# Patient Record
Sex: FEMALE | Race: WHITE | Employment: OTHER | URBAN - METROPOLITAN AREA
[De-identification: names, ages, dates, MRNs, and addresses within clinical notes are randomized per-mention and may not be internally consistent; named-entity substitution may affect disease eponyms.]

---

## 2019-10-29 ENCOUNTER — HOSPITAL ENCOUNTER (OUTPATIENT)
Age: 74
Discharge: HOME | End: 2019-10-29
Attending: EMERGENCY MEDICINE
Payer: MEDICARE

## 2019-10-29 ENCOUNTER — APPOINTMENT (OUTPATIENT)
Dept: GENERAL RADIOLOGY | Age: 74
End: 2019-10-29
Attending: EMERGENCY MEDICINE
Payer: MEDICARE

## 2019-10-29 VITALS
HEART RATE: 98 BPM | DIASTOLIC BLOOD PRESSURE: 84 MMHG | TEMPERATURE: 99 F | BODY MASS INDEX: 29.99 KG/M2 | OXYGEN SATURATION: 99 % | RESPIRATION RATE: 20 BRPM | WEIGHT: 180 LBS | SYSTOLIC BLOOD PRESSURE: 143 MMHG | HEIGHT: 65 IN

## 2019-10-29 DIAGNOSIS — R05.8 PRODUCTIVE COUGH: Primary | ICD-10-CM

## 2019-10-29 PROCEDURE — 99203 OFFICE O/P NEW LOW 30 MIN: CPT

## 2019-10-29 PROCEDURE — 99204 OFFICE O/P NEW MOD 45 MIN: CPT

## 2019-10-29 PROCEDURE — 71046 X-RAY EXAM CHEST 2 VIEWS: CPT | Performed by: EMERGENCY MEDICINE

## 2019-10-29 RX ORDER — AZITHROMYCIN 250 MG/1
TABLET, FILM COATED ORAL
Qty: 1 PACKAGE | Refills: 0 | Status: SHIPPED | OUTPATIENT
Start: 2019-10-29

## 2019-10-29 RX ORDER — LETROZOLE 2.5 MG/1
2.5 TABLET, FILM COATED ORAL DAILY
COMMUNITY

## 2019-10-29 RX ORDER — LORATADINE 10 MG/1
10 TABLET ORAL DAILY
COMMUNITY

## 2019-10-29 RX ORDER — DULOXETIN HYDROCHLORIDE 30 MG/1
30 CAPSULE, DELAYED RELEASE ORAL DAILY
COMMUNITY

## 2019-10-29 RX ORDER — SIMVASTATIN 5 MG
5 TABLET ORAL NIGHTLY
COMMUNITY

## 2019-10-29 RX ORDER — LEVOTHYROXINE SODIUM 0.05 MG/1
50 TABLET ORAL
COMMUNITY

## 2019-10-29 RX ORDER — BENZONATATE 100 MG/1
100 CAPSULE ORAL 3 TIMES DAILY PRN
Qty: 30 CAPSULE | Refills: 0 | Status: SHIPPED | OUTPATIENT
Start: 2019-10-29 | End: 2019-11-28

## 2019-10-29 NOTE — ED PROVIDER NOTES
Patient Seen in: [address]      History   Patient presents with:  Cough    Stated Complaint: cough    HPI    The patient is a 79-year-old female with past history of breast cancer, status post bilateral mastectomies who tenderness  Eyes: Nonicteric sclera, no conjunctival injection  ENT: TMs are clear and flat bilaterally.   There is no posterior pharyngeal erythema  Chest: Clear to auscultation, no tenderness  Cardiovascular: Regular rate and rhythm without murmur  Abdome Refills: 0

## 2019-10-29 NOTE — ED INITIAL ASSESSMENT (HPI)
Patient is here with a dry cough that started as a cold and then it went to a wet and productive but now has a constant dry cough. She states it started three weeks ago.

## 2020-10-09 ENCOUNTER — HOSPITAL ENCOUNTER (OUTPATIENT)
Age: 75
Discharge: HOME | End: 2020-10-09
Payer: MEDICARE

## 2020-10-09 VITALS
HEIGHT: 65 IN | WEIGHT: 189 LBS | DIASTOLIC BLOOD PRESSURE: 83 MMHG | TEMPERATURE: 97 F | SYSTOLIC BLOOD PRESSURE: 157 MMHG | OXYGEN SATURATION: 98 % | HEART RATE: 97 BPM | BODY MASS INDEX: 31.49 KG/M2 | RESPIRATION RATE: 16 BRPM

## 2020-10-09 DIAGNOSIS — R19.7 DIARRHEA, UNSPECIFIED TYPE: ICD-10-CM

## 2020-10-09 DIAGNOSIS — Z20.822 ENCOUNTER FOR LABORATORY TESTING FOR COVID-19 VIRUS: Primary | ICD-10-CM

## 2020-10-09 PROCEDURE — 99202 OFFICE O/P NEW SF 15 MIN: CPT | Performed by: NURSE PRACTITIONER

## 2020-10-09 NOTE — ED PROVIDER NOTES
Maite-She tried a RFA by Dr. Ferrer and said it didn't work, but she did not follow up. He suggested an IVETH, what do you advise?   Patient presents with:  Nausea/Vomiting/Diarrhea      HPI:     Alex Qureshi is a 69year old female who presents for evaluation and management of a chief complaint of body aches, fatigue, and diarrhea that started yesterday evening.   She states s Social connections        Talks on phone: Not on file        Gets together: Not on file        Attends Church service: Not on file        Active member of club or organization: Not on file        Attends meetings of clubs or organizations: Not on file will follow-up closely with her primary care doctor. She is aware if she develops any abdominal pain, vomiting, fevers, chest pain, difficulty breathing, or worsening symptoms, to go to the nearest emergency department for further evaluation.     Diagnosis

## 2022-09-16 ENCOUNTER — HOSPITAL ENCOUNTER (OUTPATIENT)
Age: 77
Discharge: HOME | End: 2022-09-16
Payer: MEDICARE

## 2022-09-16 VITALS
BODY MASS INDEX: 29.16 KG/M2 | HEIGHT: 65 IN | HEART RATE: 91 BPM | WEIGHT: 175 LBS | RESPIRATION RATE: 20 BRPM | OXYGEN SATURATION: 99 % | DIASTOLIC BLOOD PRESSURE: 81 MMHG | TEMPERATURE: 98 F | SYSTOLIC BLOOD PRESSURE: 158 MMHG

## 2022-09-16 DIAGNOSIS — J02.9 SORE THROAT: ICD-10-CM

## 2022-09-16 DIAGNOSIS — Z20.822 ENCOUNTER FOR SCREENING LABORATORY TESTING FOR COVID-19 VIRUS: Primary | ICD-10-CM

## 2022-09-16 DIAGNOSIS — U07.1 COVID-19: ICD-10-CM

## 2022-09-16 LAB — SARS-COV-2 RNA RESP QL NAA+PROBE: DETECTED

## 2022-09-16 RX ORDER — BENZONATATE 100 MG/1
100 CAPSULE ORAL 3 TIMES DAILY PRN
Qty: 21 CAPSULE | Refills: 0 | Status: SHIPPED | OUTPATIENT
Start: 2022-09-16 | End: 2022-09-23

## 2022-09-16 NOTE — ED INITIAL ASSESSMENT (HPI)
Patient complains of cough, sore throat, headache, stomach upset, body generalized itching, fever, hives after itching x5days

## 2022-10-06 ENCOUNTER — LABORATORY ENCOUNTER (OUTPATIENT)
Dept: LAB | Age: 77
End: 2022-10-06
Attending: INTERNAL MEDICINE
Payer: MEDICARE

## 2022-10-06 DIAGNOSIS — E03.9 ACQUIRED HYPOTHYROIDISM: ICD-10-CM

## 2022-10-06 DIAGNOSIS — E78.2 MIXED HYPERLIPIDEMIA: ICD-10-CM

## 2022-10-06 DIAGNOSIS — R53.83 FATIGUE, UNSPECIFIED TYPE: Primary | ICD-10-CM

## 2022-10-06 LAB
ALBUMIN SERPL-MCNC: 3.7 G/DL (ref 3.4–5)
ALBUMIN/GLOB SERPL: 0.9 {RATIO} (ref 1–2)
ALP LIVER SERPL-CCNC: 120 U/L
ALT SERPL-CCNC: 88 U/L
ANION GAP SERPL CALC-SCNC: 5 MMOL/L (ref 0–18)
AST SERPL-CCNC: 56 U/L (ref 15–37)
BILIRUB SERPL-MCNC: 0.7 MG/DL (ref 0.1–2)
BILIRUB UR QL CFM: NEGATIVE
BUN BLD-MCNC: 8 MG/DL (ref 7–18)
BUN/CREAT SERPL: 10.5 (ref 10–20)
CALCIUM BLD-MCNC: 9.4 MG/DL (ref 8.5–10.1)
CHLORIDE SERPL-SCNC: 102 MMOL/L (ref 98–112)
CHOLEST SERPL-MCNC: 205 MG/DL (ref ?–200)
CLARITY UR: CLEAR
CO2 SERPL-SCNC: 29 MMOL/L (ref 21–32)
COLOR UR: YELLOW
CREAT BLD-MCNC: 0.76 MG/DL
FASTING PATIENT LIPID ANSWER: YES
FASTING STATUS PATIENT QL REPORTED: YES
GFR SERPLBLD BASED ON 1.73 SQ M-ARVRAT: 82 ML/MIN/1.73M2 (ref 60–?)
GLOBULIN PLAS-MCNC: 3.9 G/DL (ref 2.8–4.4)
GLUCOSE BLD-MCNC: 276 MG/DL (ref 70–99)
GLUCOSE UR-MCNC: 250 MG/DL
HDLC SERPL-MCNC: 47 MG/DL (ref 40–59)
HGB UR QL STRIP.AUTO: NEGATIVE
LDLC SERPL CALC-MCNC: 133 MG/DL (ref ?–100)
NITRITE UR QL STRIP.AUTO: NEGATIVE
NONHDLC SERPL-MCNC: 158 MG/DL (ref ?–130)
OSMOLALITY SERPL CALC.SUM OF ELEC: 290 MOSM/KG (ref 275–295)
PH UR: 5.5 [PH] (ref 5–8)
POTASSIUM SERPL-SCNC: 3.8 MMOL/L (ref 3.5–5.1)
PROT SERPL-MCNC: 7.6 G/DL (ref 6.4–8.2)
SODIUM SERPL-SCNC: 136 MMOL/L (ref 136–145)
SP GR UR STRIP: >=1.03 (ref 1–1.03)
T4 FREE SERPL-MCNC: 0.8 NG/DL (ref 0.8–1.7)
TRIGL SERPL-MCNC: 141 MG/DL (ref 30–149)
TSI SER-ACNC: 6.91 MIU/ML (ref 0.36–3.74)
UROBILINOGEN UR STRIP-ACNC: 1
VLDLC SERPL CALC-MCNC: 26 MG/DL (ref 0–30)

## 2022-10-06 PROCEDURE — 84443 ASSAY THYROID STIM HORMONE: CPT

## 2022-10-06 PROCEDURE — 84439 ASSAY OF FREE THYROXINE: CPT

## 2022-10-06 PROCEDURE — 36415 COLL VENOUS BLD VENIPUNCTURE: CPT

## 2022-10-06 PROCEDURE — 80053 COMPREHEN METABOLIC PANEL: CPT

## 2022-10-06 PROCEDURE — 81015 MICROSCOPIC EXAM OF URINE: CPT

## 2022-10-06 PROCEDURE — 81001 URINALYSIS AUTO W/SCOPE: CPT

## 2022-10-06 PROCEDURE — 80061 LIPID PANEL: CPT

## 2023-09-27 ENCOUNTER — LAB ENCOUNTER (OUTPATIENT)
Dept: LAB | Facility: HOSPITAL | Age: 78
End: 2023-09-27
Attending: INTERNAL MEDICINE
Payer: MEDICARE

## 2023-09-27 DIAGNOSIS — E03.9 MYXEDEMA HEART DISEASE: ICD-10-CM

## 2023-09-27 DIAGNOSIS — E55.9 HYPOVITAMINOSIS D: ICD-10-CM

## 2023-09-27 DIAGNOSIS — R03.0 ELEVATED BLOOD PRESSURE READING WITHOUT DIAGNOSIS OF HYPERTENSION: ICD-10-CM

## 2023-09-27 DIAGNOSIS — R74.8 ACID PHOSPHATASE ELEVATED: ICD-10-CM

## 2023-09-27 DIAGNOSIS — R53.83 FATIGUE: ICD-10-CM

## 2023-09-27 DIAGNOSIS — E11.9 DIABETES MELLITUS (HCC): Primary | ICD-10-CM

## 2023-09-27 DIAGNOSIS — I51.9 MYXEDEMA HEART DISEASE: ICD-10-CM

## 2023-09-27 LAB
ALBUMIN SERPL-MCNC: 3.8 G/DL (ref 3.4–5)
ALBUMIN/GLOB SERPL: 1 {RATIO} (ref 1–2)
ALP LIVER SERPL-CCNC: 82 U/L
ALT SERPL-CCNC: 46 U/L
ANION GAP SERPL CALC-SCNC: 6 MMOL/L (ref 0–18)
AST SERPL-CCNC: 26 U/L (ref 15–37)
BASOPHILS # BLD AUTO: 0.06 X10(3) UL (ref 0–0.2)
BASOPHILS NFR BLD AUTO: 1.1 %
BILIRUB SERPL-MCNC: 0.6 MG/DL (ref 0.1–2)
BILIRUB UR QL: NEGATIVE
BUN BLD-MCNC: 24 MG/DL (ref 7–18)
BUN/CREAT SERPL: 30.4 (ref 10–20)
CALCIUM BLD-MCNC: 8.9 MG/DL (ref 8.5–10.1)
CHLORIDE SERPL-SCNC: 107 MMOL/L (ref 98–112)
CHOLEST SERPL-MCNC: 208 MG/DL (ref ?–200)
CO2 SERPL-SCNC: 26 MMOL/L (ref 21–32)
COLOR UR: YELLOW
CREAT BLD-MCNC: 0.79 MG/DL
DEPRECATED RDW RBC AUTO: 41.5 FL (ref 35.1–46.3)
EGFRCR SERPLBLD CKD-EPI 2021: 78 ML/MIN/1.73M2 (ref 60–?)
EOSINOPHIL # BLD AUTO: 0.27 X10(3) UL (ref 0–0.7)
EOSINOPHIL NFR BLD AUTO: 4.9 %
ERYTHROCYTE [DISTWIDTH] IN BLOOD BY AUTOMATED COUNT: 12.7 % (ref 11–15)
EST. AVERAGE GLUCOSE BLD GHB EST-MCNC: 117 MG/DL (ref 68–126)
FASTING PATIENT LIPID ANSWER: YES
FASTING STATUS PATIENT QL REPORTED: YES
GLOBULIN PLAS-MCNC: 3.9 G/DL (ref 2.8–4.4)
GLUCOSE BLD-MCNC: 108 MG/DL (ref 70–99)
GLUCOSE UR-MCNC: NORMAL MG/DL
HBA1C MFR BLD: 5.7 % (ref ?–5.7)
HCT VFR BLD AUTO: 43.3 %
HDLC SERPL-MCNC: 52 MG/DL (ref 40–59)
HGB BLD-MCNC: 14.6 G/DL
HGB UR QL STRIP.AUTO: NEGATIVE
IMM GRANULOCYTES # BLD AUTO: 0.02 X10(3) UL (ref 0–1)
IMM GRANULOCYTES NFR BLD: 0.4 %
KETONES UR-MCNC: NEGATIVE MG/DL
LDLC SERPL CALC-MCNC: 135 MG/DL (ref ?–100)
LEUKOCYTE ESTERASE UR QL STRIP.AUTO: NEGATIVE
LYMPHOCYTES # BLD AUTO: 1.73 X10(3) UL (ref 1–4)
LYMPHOCYTES NFR BLD AUTO: 31.3 %
MCH RBC QN AUTO: 30 PG (ref 26–34)
MCHC RBC AUTO-ENTMCNC: 33.7 G/DL (ref 31–37)
MCV RBC AUTO: 88.9 FL
MONOCYTES # BLD AUTO: 0.41 X10(3) UL (ref 0.1–1)
MONOCYTES NFR BLD AUTO: 7.4 %
NEUTROPHILS # BLD AUTO: 3.03 X10 (3) UL (ref 1.5–7.7)
NEUTROPHILS # BLD AUTO: 3.03 X10(3) UL (ref 1.5–7.7)
NEUTROPHILS NFR BLD AUTO: 54.9 %
NITRITE UR QL STRIP.AUTO: NEGATIVE
NONHDLC SERPL-MCNC: 156 MG/DL (ref ?–130)
OSMOLALITY SERPL CALC.SUM OF ELEC: 293 MOSM/KG (ref 275–295)
PH UR: 5.5 [PH] (ref 5–8)
PLATELET # BLD AUTO: 255 10(3)UL (ref 150–450)
POTASSIUM SERPL-SCNC: 4.1 MMOL/L (ref 3.5–5.1)
PROT SERPL-MCNC: 7.7 G/DL (ref 6.4–8.2)
PROT UR-MCNC: 20 MG/DL
RBC # BLD AUTO: 4.87 X10(6)UL
SODIUM SERPL-SCNC: 139 MMOL/L (ref 136–145)
SP GR UR STRIP: 1.03 (ref 1–1.03)
TRIGL SERPL-MCNC: 117 MG/DL (ref 30–149)
TSI SER-ACNC: 5.74 MIU/ML (ref 0.36–3.74)
UROBILINOGEN UR STRIP-ACNC: 2
VIT D+METAB SERPL-MCNC: 41.1 NG/ML (ref 30–100)
VLDLC SERPL CALC-MCNC: 21 MG/DL (ref 0–30)
WBC # BLD AUTO: 5.5 X10(3) UL (ref 4–11)

## 2023-09-27 PROCEDURE — 81001 URINALYSIS AUTO W/SCOPE: CPT

## 2023-09-27 PROCEDURE — 85025 COMPLETE CBC W/AUTO DIFF WBC: CPT

## 2023-09-27 PROCEDURE — 80053 COMPREHEN METABOLIC PANEL: CPT

## 2023-09-27 PROCEDURE — 84443 ASSAY THYROID STIM HORMONE: CPT

## 2023-09-27 PROCEDURE — 36415 COLL VENOUS BLD VENIPUNCTURE: CPT

## 2023-09-27 PROCEDURE — 82306 VITAMIN D 25 HYDROXY: CPT

## 2023-09-27 PROCEDURE — 80061 LIPID PANEL: CPT

## 2023-09-27 PROCEDURE — 83036 HEMOGLOBIN GLYCOSYLATED A1C: CPT

## 2024-01-24 ENCOUNTER — ORDER TRANSCRIPTION (OUTPATIENT)
Dept: ADMINISTRATIVE | Facility: HOSPITAL | Age: 79
End: 2024-01-24

## 2024-01-24 DIAGNOSIS — Z01.818 PRE-OP EXAM: ICD-10-CM

## 2024-01-24 DIAGNOSIS — C44.91 BASAL CELL CARCINOMA: Primary | ICD-10-CM

## 2024-02-06 ENCOUNTER — LAB ENCOUNTER (OUTPATIENT)
Dept: LAB | Age: 79
End: 2024-02-06
Attending: DERMATOLOGY
Payer: MEDICARE

## 2024-02-06 DIAGNOSIS — C44.91 BASAL CELL CARCINOMA: ICD-10-CM

## 2024-02-06 DIAGNOSIS — Z01.818 PRE-OP EXAM: ICD-10-CM

## 2024-02-06 PROCEDURE — 87635 SARS-COV-2 COVID-19 AMP PRB: CPT

## 2024-02-07 LAB — SARS-COV-2 RNA RESP QL NAA+PROBE: NOT DETECTED

## 2024-09-25 ENCOUNTER — LAB ENCOUNTER (OUTPATIENT)
Dept: LAB | Facility: HOSPITAL | Age: 79
End: 2024-09-25
Attending: INTERNAL MEDICINE
Payer: MEDICARE

## 2024-09-25 DIAGNOSIS — R53.82 CHRONIC FATIGUE: ICD-10-CM

## 2024-09-25 DIAGNOSIS — E11.9 DIABETES MELLITUS (HCC): Primary | ICD-10-CM

## 2024-09-25 DIAGNOSIS — E03.9 ACQUIRED HYPOTHYROIDISM: ICD-10-CM

## 2024-09-25 DIAGNOSIS — C50.512 MALIGNANT NEOPLASM OF LOWER-OUTER QUADRANT OF LEFT FEMALE BREAST (HCC): ICD-10-CM

## 2024-09-25 LAB
ALBUMIN SERPL-MCNC: 4.4 G/DL (ref 3.2–4.8)
ALBUMIN/GLOB SERPL: 1.4 {RATIO} (ref 1–2)
ALP LIVER SERPL-CCNC: 92 U/L
ALT SERPL-CCNC: 38 U/L
ANION GAP SERPL CALC-SCNC: 5 MMOL/L (ref 0–18)
AST SERPL-CCNC: 29 U/L (ref ?–34)
BASOPHILS # BLD AUTO: 0.05 X10(3) UL (ref 0–0.2)
BASOPHILS NFR BLD AUTO: 0.9 %
BILIRUB SERPL-MCNC: 1 MG/DL (ref 0.2–1.1)
BILIRUB UR QL: NEGATIVE
BUN BLD-MCNC: 10 MG/DL (ref 9–23)
BUN/CREAT SERPL: 13.5 (ref 10–20)
CALCIUM BLD-MCNC: 9.6 MG/DL (ref 8.7–10.4)
CHLORIDE SERPL-SCNC: 105 MMOL/L (ref 98–112)
CHOLEST SERPL-MCNC: 163 MG/DL (ref ?–200)
CO2 SERPL-SCNC: 29 MMOL/L (ref 21–32)
CREAT BLD-MCNC: 0.74 MG/DL
DEPRECATED RDW RBC AUTO: 42.3 FL (ref 35.1–46.3)
EGFRCR SERPLBLD CKD-EPI 2021: 84 ML/MIN/1.73M2 (ref 60–?)
EOSINOPHIL # BLD AUTO: 0.2 X10(3) UL (ref 0–0.7)
EOSINOPHIL NFR BLD AUTO: 3.5 %
ERYTHROCYTE [DISTWIDTH] IN BLOOD BY AUTOMATED COUNT: 12.9 % (ref 11–15)
EST. AVERAGE GLUCOSE BLD GHB EST-MCNC: 108 MG/DL (ref 68–126)
FASTING PATIENT LIPID ANSWER: YES
FASTING STATUS PATIENT QL REPORTED: YES
GLOBULIN PLAS-MCNC: 3.2 G/DL (ref 2–3.5)
GLUCOSE BLD-MCNC: 100 MG/DL (ref 70–99)
GLUCOSE UR-MCNC: NORMAL MG/DL
HBA1C MFR BLD: 5.4 % (ref ?–5.7)
HCT VFR BLD AUTO: 42.8 %
HDLC SERPL-MCNC: 51 MG/DL (ref 40–59)
HGB BLD-MCNC: 14.1 G/DL
HGB UR QL STRIP.AUTO: NEGATIVE
IMM GRANULOCYTES # BLD AUTO: 0.01 X10(3) UL (ref 0–1)
IMM GRANULOCYTES NFR BLD: 0.2 %
KETONES UR-MCNC: NEGATIVE MG/DL
LDLC SERPL CALC-MCNC: 91 MG/DL (ref ?–100)
LEUKOCYTE ESTERASE UR QL STRIP.AUTO: 75
LYMPHOCYTES # BLD AUTO: 1.29 X10(3) UL (ref 1–4)
LYMPHOCYTES NFR BLD AUTO: 22.7 %
MCH RBC QN AUTO: 29.6 PG (ref 26–34)
MCHC RBC AUTO-ENTMCNC: 32.9 G/DL (ref 31–37)
MCV RBC AUTO: 89.7 FL
MONOCYTES # BLD AUTO: 0.36 X10(3) UL (ref 0.1–1)
MONOCYTES NFR BLD AUTO: 6.3 %
NEUTROPHILS # BLD AUTO: 3.77 X10 (3) UL (ref 1.5–7.7)
NEUTROPHILS # BLD AUTO: 3.77 X10(3) UL (ref 1.5–7.7)
NEUTROPHILS NFR BLD AUTO: 66.4 %
NITRITE UR QL STRIP.AUTO: NEGATIVE
NONHDLC SERPL-MCNC: 112 MG/DL (ref ?–130)
OSMOLALITY SERPL CALC.SUM OF ELEC: 287 MOSM/KG (ref 275–295)
PH UR: 7.5 [PH] (ref 5–8)
PLATELET # BLD AUTO: 252 10(3)UL (ref 150–450)
POTASSIUM SERPL-SCNC: 4.2 MMOL/L (ref 3.5–5.1)
PROT SERPL-MCNC: 7.6 G/DL (ref 5.7–8.2)
PROT UR-MCNC: NEGATIVE MG/DL
RBC # BLD AUTO: 4.77 X10(6)UL
SODIUM SERPL-SCNC: 139 MMOL/L (ref 136–145)
SP GR UR STRIP: 1.02 (ref 1–1.03)
TRIGL SERPL-MCNC: 119 MG/DL (ref 30–149)
TSI SER-ACNC: 3.17 MIU/ML (ref 0.55–4.78)
UROBILINOGEN UR STRIP-ACNC: NORMAL
VLDLC SERPL CALC-MCNC: 19 MG/DL (ref 0–30)
WBC # BLD AUTO: 5.7 X10(3) UL (ref 4–11)

## 2024-09-25 PROCEDURE — 80061 LIPID PANEL: CPT

## 2024-09-25 PROCEDURE — 84443 ASSAY THYROID STIM HORMONE: CPT

## 2024-09-25 PROCEDURE — 81001 URINALYSIS AUTO W/SCOPE: CPT

## 2024-09-25 PROCEDURE — 85025 COMPLETE CBC W/AUTO DIFF WBC: CPT

## 2024-09-25 PROCEDURE — 36415 COLL VENOUS BLD VENIPUNCTURE: CPT

## 2024-09-25 PROCEDURE — 80053 COMPREHEN METABOLIC PANEL: CPT

## 2024-09-25 PROCEDURE — 83036 HEMOGLOBIN GLYCOSYLATED A1C: CPT

## 2025-07-08 ENCOUNTER — TELEPHONE (OUTPATIENT)
Facility: CLINIC | Age: 80
End: 2025-07-08

## 2025-07-08 ENCOUNTER — OFFICE VISIT (OUTPATIENT)
Facility: CLINIC | Age: 80
End: 2025-07-08
Payer: COMMERCIAL

## 2025-07-08 VITALS
WEIGHT: 188 LBS | DIASTOLIC BLOOD PRESSURE: 78 MMHG | HEART RATE: 82 BPM | SYSTOLIC BLOOD PRESSURE: 147 MMHG | HEIGHT: 65 IN | BODY MASS INDEX: 31.32 KG/M2

## 2025-07-08 DIAGNOSIS — Z12.11 SCREENING FOR COLON CANCER: Primary | ICD-10-CM

## 2025-07-08 DIAGNOSIS — Z12.11 SCREEN FOR COLON CANCER: Primary | ICD-10-CM

## 2025-07-08 RX ORDER — SODIUM, POTASSIUM,MAG SULFATES 17.5-3.13G
SOLUTION, RECONSTITUTED, ORAL ORAL
Qty: 1 EACH | Refills: 0 | Status: SHIPPED | OUTPATIENT
Start: 2025-07-08

## 2025-07-08 RX ORDER — MONTELUKAST SODIUM 10 MG/1
10 TABLET ORAL NIGHTLY
COMMUNITY
Start: 2025-01-15

## 2025-07-08 NOTE — PATIENT INSTRUCTIONS
1. Schedule colonoscopy with General Pool Endoscopist  Diagnosis: colon cancer screening  Sedation: MAC  Prep: Suprep      2. MEDICATION CHANGES PRIOR TO PROCEDURE    Please read the Bowel Preparation Instruction Sheet carefully, as it contains important information regarding your medications before the procedure. Many medications will need to be temporarily stopped to ensure your safety and the success of the procedure.   Commonly stopped medications include:    Diabetic medications (such as: metformin, glimepiride, sitagliptin (januvia), empagliflozin (jardiance), GLP-1 (semaglutide/ozempic/wegovy), insulin)    Weight loss medications (such as: phentermine, semaglutide/wegovy/ozempic, or tirzepatide/zepbound/mounjaro, contrave)    Anticoagulants or antiplatelet medications \"blood thinner\" (such as apixaban (Eliquis), warfarin (Coumadin), clopidogrel (Plavix), rivaroxaban (Xarelto))    Certain hypertension medications \"Ace inhibitors or ARBs\" (such as lisinopril, enalapril, losartan, valsartan, olmesartan)    Medications to treat erectile dysfunction, benign prostatic hyperplasia or pulmonary hypertension (such as viagra/sildenafil, Tadalafil (Cialis)    Additionally, vitamins and supplements should be stopped for 2 weeks prior to the procedure.    Failure to correctly stop these medications as instructed can result in cancellation of the procedure for your safety. It is crucial to follow these instructions closely. If you have any questions or concerns regarding your medications, please contact your healthcare provider before the procedure date.      *please read bowel preparation handout for complete list of medication adjustments*  14 Days Before Procedure   STOP all vitamins and supplements, including: Iron, multivitamins, vitamin E, herbal/mineral supplements, and over-the-counter diet medications    7 Days Before Procedure  STOP prasugrel (Effient) - antiplatelet  STOP Ozempic, semaglutide, trulicity,  mounjaro, wegovy  STOP any weight less medication    5 Days Before Procedure   STOP any of the following:  - Warfarin (Coumadin)  - Ticagrelor (Brilinta)  - Clopidogrel (Plavix)  - Acetylsalicylic acid / Dipyridamole (Aggrenox)    4 Days Before Procedure   STOP any of the following:  - Jardiance  - Dapagliflozin (Farxiga)  - Canagliflozin (Invokana)    3 Days Before Procedure   STOP any erectile dysfunction medications (e.g., Viagra, Cialis, Levitra, Slendra)    2 Days (48 Hours) Before Procedure   STOP any of the following:  - Rivaroxaban (Xarelto)  - Apixaban (Eliquis)  - Edoxaban (Savaysa)  - Dabigatran (Pradaxa)    1 Day Before & Day of Procedure   STOP Metformin   STOP Glimepiride, glipizide, pioglitazone   STOP Januvia, Tradjenta, Janumet    Day of Procedure   STOP all ADHD medications   STOP all diuretics    24 Hours Before Procedure   DO NOT CONSUME any alcohol or use recreational drugs       3.  bowel prep from pharmacy   You can pick the bowel prep up now and store in a cool, dry place in your home until your scheduled bowel prep start date.    4. Read all bowel prep instructions carefully. Bowel prep instructions can also be found online at:  www.eehealth.org/giprep     5. AVOID seeds, nuts, popcorn, raw fruits and vegetables for 5 days before procedure    6. If you start any NEW medication after your visit today, please notify us. Certain medications will need to be stopped before the procedure, or the procedure cannot be performed safely.

## 2025-07-08 NOTE — TELEPHONE ENCOUNTER
Patient was seen in office today with CHRISTIE Paz and was given orders to schedule. Please call patient to schedule his/her procedure with the orders given below. Patient was given the phone number and prep instructions at the time of the office visit. The prep instructions was also explained to the patient at the time of the visit. The patient verbalized understanding the prep and that a GI  will call him/her for the procedure.  If patient calls before the 1 week turnaround please schedule with the orders given below, thank you!    Orders from CHRISTIE Paz    1. Schedule colonoscopy with General Pool Endoscopist  Diagnosis: colon cancer screening  Sedation: MAC  Prep: Suprep        2. MEDICATION CHANGES PRIOR TO PROCEDURE    Please read the Bowel Preparation Instruction Sheet carefully, as it contains important information regarding your medications before the procedure. Many medications will need to be temporarily stopped to ensure your safety and the success of the procedure.   Commonly stopped medications include:    Diabetic medications (such as: metformin, glimepiride, sitagliptin (januvia), empagliflozin (jardiance), GLP-1 (semaglutide/ozempic/wegovy), insulin)    Weight loss medications (such as: phentermine, semaglutide/wegovy/ozempic, or tirzepatide/zepbound/mounjaro, contrave)    Anticoagulants or antiplatelet medications \"blood thinner\" (such as apixaban (Eliquis), warfarin (Coumadin), clopidogrel (Plavix), rivaroxaban (Xarelto))    Certain hypertension medications \"Ace inhibitors or ARBs\" (such as lisinopril, enalapril, losartan, valsartan, olmesartan)     Medications to treat erectile dysfunction, benign prostatic hyperplasia or pulmonary hypertension (such as viagra/sildenafil, Tadalafil (Cialis)    Additionally, vitamins and supplements should be stopped for 2 weeks prior to the procedure.    Failure to correctly stop these medications as instructed can result in  cancellation of the procedure for your safety. It is crucial to follow these instructions closely. If you have any questions or concerns regarding your medications, please contact your healthcare provider before the procedure date.       *please read bowel preparation handout for complete list of medication adjustments*  14 Days Before Procedure   STOP all vitamins and supplements, including: Iron, multivitamins, vitamin E, herbal/mineral supplements, and over-the-counter diet medications    7 Days Before Procedure  STOP prasugrel (Effient) - antiplatelet  STOP Ozempic, semaglutide, trulicity, mounjaro, wegovy  STOP any weight less medication    5 Days Before Procedure   STOP any of the following:  - Warfarin (Coumadin)  - Ticagrelor (Brilinta)  - Clopidogrel (Plavix)  - Acetylsalicylic acid / Dipyridamole (Aggrenox)    4 Days Before Procedure   STOP any of the following:  - Jardiance  - Dapagliflozin (Farxiga)  - Canagliflozin (Invokana)    3 Days Before Procedure   STOP any erectile dysfunction medications (e.g., Viagra, Cialis, Levitra, Slendra)    2 Days (48 Hours) Before Procedure   STOP any of the following:  - Rivaroxaban (Xarelto)  - Apixaban (Eliquis)  - Edoxaban (Savaysa)  - Dabigatran (Pradaxa)    1 Day Before & Day of Procedure   STOP Metformin   STOP Glimepiride, glipizide, pioglitazone   STOP Januvia, Tradjenta, Janumet    Day of Procedure   STOP all ADHD medications   STOP all diuretics    24 Hours Before Procedure   DO NOT CONSUME any alcohol or use recreational drugs         3.  bowel prep from pharmacy   You can pick the bowel prep up now and store in a cool, dry place in your home until your scheduled bowel prep start date.     4. Read all bowel prep instructions carefully. Bowel prep instructions can also be found online at:  www.eehealth.org/giprep      5. AVOID seeds, nuts, popcorn, raw fruits and vegetables for 5 days before procedure     6. If you start any NEW medication after your  visit today, please notify us. Certain medications will need to be stopped before the procedure, or the procedure cannot be performed safely.

## 2025-07-08 NOTE — H&P
[facility] - Gastroenterology                                                                                                  Clinic History and Physical     Chief Complaint   Patient presents with    Colonoscopy Screening     No history.        Requesting physician or provider: GERARDO LEO    HPI:   Francy Beltran is a 79 year old year-old female with history of breast cancer, hypothyroidism, hyperlipidemia, fibromyalgia. The patient presents for colon cancer screening evaluation.    Last colonoscopy 2014 at [facility]. No polyps, was given 10 year recall.   Hx breast cancer 12 years ago, oncology reccommended repeating even though she is over the age of 77.    Patient denies any GI symptoms of nausea, vomiting, heartburn, dyspepsia, dysphagia, hematemesis, abdominal pain, change in bowel habits, constipation, diarrhea, hematochezia, or melena.  Additionally there is no unintentional weight loss.      PRIOR GI WORK UP:   11/14:  Procedure:           Colonoscopy  Indications:         Screening for colorectal malignant neoplasm  Providers:           Michael J. Goldberg, MD  Referring MD:        George Miller  Medicines:           Fentanyl 75 micrograms IV, Midazolam 4 mg IV  Complications:       No immediate complications.    Procedure:           Pre-Anesthesia Assessment:                      - Prior to the procedure, a History and Physical was                      performed, and patient medications, allergies and                      sensitivities were reviewed. The patient's tolerance of                      previous anesthesia was reviewed.                      - The risks and benefits of the procedure and the                      sedation options and risks were discussed with the                      patient. All questions were answered and informed                      consent was obtained.                      After I obtained informed consent, the scope was passed                      under  direct vision. Throughout the procedure, the                      patient's blood pressure, pulse, and oxygen saturations                      were monitored continuously. Specimens: as below,                      otherwise none. The pediatric variable colonoscope was                      introduced through the anus and advanced to the cecum,                      identified by appendiceal orifice & ileocecal valve.                      The colonoscopy was performed without difficulty. The                      patient tolerated the procedure well. The quality of                      the bowel preparation was good.        NSAIDS: none  Tobacco: none  Alcohol: none  Marijuana: none  Illicit drugs: none    FH GI malignancy:  none    No history of adverse reaction to sedation  No BASSEM  No anticoagulants/antiplatelet  No pacemaker/defibrillator  No pain medications and/or sleep aides    Wt Readings from Last 6 Encounters:   07/08/25 188 lb (85.3 kg)   09/16/22 175 lb (79.4 kg)   10/09/20 189 lb (85.7 kg)   10/29/19 180 lb (81.6 kg)          History, Medications, Allergies, ROS:      Past Medical History[1]   Past Surgical History[2]   Family Hx: Family History[3]   Social History: Short Social Hx on File[4]     Medications (Active prior to today's visit):  Current Medications[5]    Allergies:  Allergies[6]    ROS:   CONSTITUTIONAL: negative for fevers, chills, sweats  EYES Negative for scleral icterus or redness, and diplopia  HEENT: Negative for hoarseness  RESPIRATORY: Negative for cough and severe shortness of breath  CARDIOVASCULAR: Negative for crushing sub-sternal chest pain  GASTROINTESTINAL: See HPI  GENITOURINARY: Negative for dysuria  MUSCULOSKELETAL: Negative for arthralgias and myalgias  SKIN: Negative for jaundice, rash or pruritus  NEUROLOGICAL: Negative for dizziness and headaches  BEHAVIOR/PSYCH: Negative for psychotic behavior      PHYSICAL EXAM:   Blood pressure 147/78, pulse 82, height 5' 5\" (1.651 m),  weight 188 lb (85.3 kg).    GEN: Alert, no acute distress, well-nourished   HEENT: anicteric sclera, neck supple, trachea midline, MMM, no palpable or tender neck or supraclavicular lymph nodes  CV: RRR, the extremities are warm and well perfused   LUNGS: No increased work of breathing, CTAB  ABDOMEN: Soft, symmetrical, non-tender without distention or guarding. No scars or lesions. Aorta is without bruit or visible pulsation. Umbilicus is midline without herniation. Normoactive bowel sounds are present, No masses, hepatomegaly or splenomegaly noted.  MSK: No erythema, no warmth, no swelling of joints  SKIN: No jaundice, no erythema, no rashes, no lesions  HEMATOLOGIC: No bleeding, no bruising  NEURO: Alert and interactive, PITT  PSYCH: appropriate mood & affect    Labs/Imaging:     Patient's labs and imaging were reviewed and discussed with patient today.    .  ASSESSMENT/PLAN:   Francy Beltran is a 79 year old year-old female with history of breast cancer, hypothyroidism, hyperlipidemia, fibromyalgia. The patient presents for colon cancer screening evaluation.    #colorectal cancer screening: patient is considered average risk for colon cancer (No immediate family hx of colon cancer) and it is appropriate to proceed with screening colonoscopy. Patient is currently asymptomatic and denies diarrhea, hematochezia, thin-stools or weight loss. We discussed risks/benefits/alternatives to procedure, including stool testing, they want to proceed with colonoscopy.  No anemia noted on blood work.        Patient Instructions   1. Schedule colonoscopy with General Pool Endoscopist  Diagnosis: colon cancer screening  Sedation: MAC  Prep: Suprep      2. MEDICATION CHANGES PRIOR TO PROCEDURE    Please read the Bowel Preparation Instruction Sheet carefully, as it contains important information regarding your medications before the procedure. Many medications will need to be temporarily stopped to ensure your safety and  the success of the procedure.   Commonly stopped medications include:    Diabetic medications (such as: metformin, glimepiride, sitagliptin (januvia), empagliflozin (jardiance), GLP-1 (semaglutide/ozempic/wegovy), insulin)    Weight loss medications (such as: phentermine, semaglutide/wegovy/ozempic, or tirzepatide/zepbound/mounjaro, contrave)    Anticoagulants or antiplatelet medications \"blood thinner\" (such as apixaban (Eliquis), warfarin (Coumadin), clopidogrel (Plavix), rivaroxaban (Xarelto))    Certain hypertension medications \"Ace inhibitors or ARBs\" (such as lisinopril, enalapril, losartan, valsartan, olmesartan)    Medications to treat erectile dysfunction, benign prostatic hyperplasia or pulmonary hypertension (such as viagra/sildenafil, Tadalafil (Cialis)    Additionally, vitamins and supplements should be stopped for 2 weeks prior to the procedure.    Failure to correctly stop these medications as instructed can result in cancellation of the procedure for your safety. It is crucial to follow these instructions closely. If you have any questions or concerns regarding your medications, please contact your healthcare provider before the procedure date.      *please read bowel preparation handout for complete list of medication adjustments*  14 Days Before Procedure   STOP all vitamins and supplements, including: Iron, multivitamins, vitamin E, herbal/mineral supplements, and over-the-counter diet medications    7 Days Before Procedure  STOP prasugrel (Effient) - antiplatelet  STOP Ozempic, semaglutide, trulicity, mounjaro, wegovy  STOP any weight less medication    5 Days Before Procedure   STOP any of the following:  - Warfarin (Coumadin)  - Ticagrelor (Brilinta)  - Clopidogrel (Plavix)  - Acetylsalicylic acid / Dipyridamole (Aggrenox)    4 Days Before Procedure   STOP any of the following:  - Jardiance  - Dapagliflozin (Farxiga)  - Canagliflozin (Invokana)    3 Days Before Procedure   STOP any erectile  dysfunction medications (e.g., Viagra, Cialis, Levitra, Slendra)    2 Days (48 Hours) Before Procedure   STOP any of the following:  - Rivaroxaban (Xarelto)  - Apixaban (Eliquis)  - Edoxaban (Savaysa)  - Dabigatran (Pradaxa)    1 Day Before & Day of Procedure   STOP Metformin   STOP Glimepiride, glipizide, pioglitazone   STOP Januvia, Tradjenta, Janumet    Day of Procedure   STOP all ADHD medications   STOP all diuretics    24 Hours Before Procedure   DO NOT CONSUME any alcohol or use recreational drugs       3.  bowel prep from pharmacy   You can pick the bowel prep up now and store in a cool, dry place in your home until your scheduled bowel prep start date.    4. Read all bowel prep instructions carefully. Bowel prep instructions can also be found online at:  www.ee[facility].org/giprep     5. AVOID seeds, nuts, popcorn, raw fruits and vegetables for 5 days before procedure    6. If you start any NEW medication after your visit today, please notify us. Certain medications will need to be stopped before the procedure, or the procedure cannot be performed safely.       Endoscopy risk/benefit discussion: I have thoroughly discussed the risks, benefits, and alternatives of endoscopic evaluation with the patient, who demonstrated understanding. This includes the potential risks of bleeding, infection, pain, anesthesia complications, and perforation, which may result in prolonged hospitalization or surgical intervention. All of the patient’s questions were addressed to their satisfaction. The patient has chosen to proceed with the endoscopic procedure, including any necessary interventions such as polypectomy, biopsy, control of bleeding.      Orders This Visit:  No orders of the defined types were placed in this encounter.      Meds This Visit:  Requested Prescriptions     Signed Prescriptions Disp Refills    Na Sulfate-K Sulfate-Mg Sulf (SUPREP BOWEL PREP KIT) 17.5-3.13-1.6 GM/177ML Oral Solution 1 each 0     Sig:  Take as discussed in clinic       Imaging & Referrals:  None       CHRISTIE Paz    [facility]  7/8/2025               [1]   Past Medical History:   Cancer (HCC)    Hyperlipidemia   [2]   Past Surgical History:  Procedure Laterality Date    Appendectomy      Breast surgery      Tonsillectomy     [3] History reviewed. No pertinent family history.  [4]   Social History  Socioeconomic History    Marital status:    Tobacco Use    Smoking status: Never    Smokeless tobacco: Never   Vaping Use    Vaping status: Never Used   Substance and Sexual Activity    Alcohol use: Never    Drug use: Never   [5]   Current Outpatient Medications   Medication Sig Dispense Refill    montelukast 10 MG Oral Tab Take 1 tablet (10 mg total) by mouth nightly.      Na Sulfate-K Sulfate-Mg Sulf (SUPREP BOWEL PREP KIT) 17.5-3.13-1.6 GM/177ML Oral Solution Take as discussed in clinic 1 each 0    Levothyroxine Sodium 50 MCG Oral Tab Take 50 mcg by mouth before breakfast.      simvastatin 5 MG Oral Tab Take 5 mg by mouth nightly.      Calcium Carbonate Antacid 400 MG Oral Chew Tab Chew 800 mg by mouth daily.      azithromycin (ZITHROMAX Z-ENMANUEL) 250 MG Oral Tab 500 mg once followed by 250 mg daily x 4 days (Patient not taking: Reported on 9/16/2022) 1 Package 0    letrozole 2.5 MG Oral Tab Take 2.5 mg by mouth daily.      DULoxetine HCl 30 MG Oral Cap DR Particles Take 30 mg by mouth daily.      loratadine 10 MG Oral Tab Take 10 mg by mouth daily.     [6]   Allergies  Allergen Reactions    Sulfa Antibiotics HIVES

## 2025-07-16 NOTE — TELEPHONE ENCOUNTER
Scheduled for:  Colonoscopy 17808  Provider Name:     Date:  Tuesday, 10/21/2025   Location:  [facility]   Sedation:  Mac   Time:  2;30 pm (pt is aware Cf will call with arrival time     Prep:  Suprep   Meds/Allergies Reconciled?:  Yes    Diagnosis with codes:  colon cancer screening Z12.11  Was patient informed to call insurance with codes (Y/N):  Yes     Referral sent?:  Referral was sent at the time of electronic surgical scheduling.    [facility] or [facility] notified?:  I sent an electronic request to Endo Scheduling and received a confirmation today.       Medication Orders:  Diabetic medications (such as: metformin, glimepiride, sitagliptin (januvia), empagliflozin (jardiance), GLP-1 (semaglutide/ozempic/wegovy), insulin)    Weight loss medications (such as: phentermine, semaglutide/wegovy/ozempic, or tirzepatide/zepbound/mounjaro, contrave)   Misc Orders:  None     Further instructions given by staff:  I discussed the prep instructions with the patient which she verbally understood and is aware that I will send the instructions today.via [employer]

## 2025-07-25 PROCEDURE — 99284 EMERGENCY DEPT VISIT MOD MDM: CPT

## 2025-07-25 PROCEDURE — 93005 ELECTROCARDIOGRAM TRACING: CPT

## 2025-07-25 PROCEDURE — 96374 THER/PROPH/DIAG INJ IV PUSH: CPT

## 2025-07-25 PROCEDURE — 96375 TX/PRO/DX INJ NEW DRUG ADDON: CPT

## 2025-07-25 PROCEDURE — 93010 ELECTROCARDIOGRAM REPORT: CPT

## 2025-07-25 PROCEDURE — 96361 HYDRATE IV INFUSION ADD-ON: CPT

## 2025-07-26 ENCOUNTER — HOSPITAL ENCOUNTER (EMERGENCY)
Facility: HOSPITAL | Age: 80
Discharge: HOME | End: 2025-07-26
Attending: EMERGENCY MEDICINE

## 2025-07-26 VITALS
HEART RATE: 89 BPM | OXYGEN SATURATION: 96 % | BODY MASS INDEX: 31 KG/M2 | WEIGHT: 186 LBS | SYSTOLIC BLOOD PRESSURE: 131 MMHG | DIASTOLIC BLOOD PRESSURE: 76 MMHG | RESPIRATION RATE: 18 BRPM

## 2025-07-26 DIAGNOSIS — K52.9 GASTROENTERITIS: Primary | ICD-10-CM

## 2025-07-26 LAB
ALBUMIN SERPL-MCNC: 4.7 G/DL (ref 3.2–4.8)
ALBUMIN/GLOB SERPL: 1.6 (ref 1–2)
ALP LIVER SERPL-CCNC: 81 U/L (ref 55–142)
ALT SERPL-CCNC: 41 U/L (ref 10–49)
ANION GAP SERPL CALC-SCNC: 8 MMOL/L (ref 0–18)
AST SERPL-CCNC: 34 U/L (ref ?–34)
ATRIAL RATE: 102 BPM
BASOPHILS # BLD AUTO: 0.06 X10(3) UL (ref 0–0.2)
BASOPHILS NFR BLD AUTO: 0.4 %
BILIRUB SERPL-MCNC: 0.6 MG/DL (ref 0.2–1.1)
BILIRUB UR QL: NEGATIVE
BUN BLD-MCNC: 13 MG/DL (ref 9–23)
BUN/CREAT SERPL: 14.1 (ref 10–20)
CALCIUM BLD-MCNC: 9 MG/DL (ref 8.7–10.4)
CHLORIDE SERPL-SCNC: 104 MMOL/L (ref 98–112)
CLARITY UR: CLEAR
CO2 SERPL-SCNC: 25 MMOL/L (ref 21–32)
COLOR UR: YELLOW
CREAT BLD-MCNC: 0.92 MG/DL (ref 0.55–1.02)
DEPRECATED RDW RBC AUTO: 40.4 FL (ref 35.1–46.3)
EGFRCR SERPLBLD CKD-EPI 2021: 64 ML/MIN/1.73M2 (ref 60–?)
EOSINOPHIL # BLD AUTO: 0.11 X10(3) UL (ref 0–0.7)
EOSINOPHIL NFR BLD AUTO: 0.7 %
ERYTHROCYTE [DISTWIDTH] IN BLOOD BY AUTOMATED COUNT: 12.4 % (ref 11–15)
GLOBULIN PLAS-MCNC: 2.9 G/DL (ref 2–3.5)
GLUCOSE BLD-MCNC: 153 MG/DL (ref 70–99)
GLUCOSE UR-MCNC: NORMAL MG/DL
HCT VFR BLD AUTO: 42.1 % (ref 35–48)
HGB BLD-MCNC: 14.5 G/DL (ref 12–16)
HGB UR QL STRIP.AUTO: NEGATIVE
IMM GRANULOCYTES # BLD AUTO: 0.07 X10(3) UL (ref 0–1)
IMM GRANULOCYTES NFR BLD: 0.5 %
KETONES UR-MCNC: 10 MG/DL
LEUKOCYTE ESTERASE UR QL STRIP.AUTO: 75
LIPASE SERPL-CCNC: 27 U/L (ref 12–53)
LYMPHOCYTES # BLD AUTO: 0.9 X10(3) UL (ref 1–4)
LYMPHOCYTES NFR BLD AUTO: 6 %
MCH RBC QN AUTO: 30.5 PG (ref 26–34)
MCHC RBC AUTO-ENTMCNC: 34.4 G/DL (ref 31–37)
MCV RBC AUTO: 88.4 FL (ref 80–100)
MONOCYTES # BLD AUTO: 0.89 X10(3) UL (ref 0.1–1)
MONOCYTES NFR BLD AUTO: 5.9 %
NEUTROPHILS # BLD AUTO: 13 X10 (3) UL (ref 1.5–7.7)
NEUTROPHILS # BLD AUTO: 13 X10(3) UL (ref 1.5–7.7)
NEUTROPHILS NFR BLD AUTO: 86.5 %
NITRITE UR QL STRIP.AUTO: NEGATIVE
OSMOLALITY SERPL CALC.SUM OF ELEC: 287 MOSM/KG (ref 275–295)
P AXIS: 46 DEGREES
P-R INTERVAL: 164 MS
PH UR: 5.5 (ref 5–8)
PLATELET # BLD AUTO: 238 10(3)UL (ref 150–450)
POTASSIUM SERPL-SCNC: 4.4 MMOL/L (ref 3.5–5.1)
PROT SERPL-MCNC: 7.6 G/DL (ref 5.7–8.2)
Q-T INTERVAL: 376 MS
QRS DURATION: 104 MS
QTC CALCULATION (BEZET): 490 MS
R AXIS: -31 DEGREES
RBC # BLD AUTO: 4.76 X10(6)UL (ref 3.8–5.3)
SODIUM SERPL-SCNC: 137 MMOL/L (ref 136–145)
SP GR UR STRIP: 1.03 (ref 1–1.03)
T AXIS: 85 DEGREES
UROBILINOGEN UR STRIP-ACNC: NORMAL
VENTRICULAR RATE: 102 BPM
WBC # BLD AUTO: 15 X10(3) UL (ref 4–11)

## 2025-07-26 PROCEDURE — 80053 COMPREHEN METABOLIC PANEL: CPT | Performed by: EMERGENCY MEDICINE

## 2025-07-26 PROCEDURE — 81001 URINALYSIS AUTO W/SCOPE: CPT | Performed by: EMERGENCY MEDICINE

## 2025-07-26 PROCEDURE — 83690 ASSAY OF LIPASE: CPT | Performed by: EMERGENCY MEDICINE

## 2025-07-26 PROCEDURE — 85025 COMPLETE CBC W/AUTO DIFF WBC: CPT | Performed by: EMERGENCY MEDICINE

## 2025-07-26 PROCEDURE — 87086 URINE CULTURE/COLONY COUNT: CPT | Performed by: EMERGENCY MEDICINE

## 2025-07-26 RX ORDER — ONDANSETRON 2 MG/ML
4 INJECTION INTRAMUSCULAR; INTRAVENOUS ONCE
Status: COMPLETED | OUTPATIENT
Start: 2025-07-26 | End: 2025-07-26

## 2025-07-26 RX ORDER — KETOROLAC TROMETHAMINE 15 MG/ML
15 INJECTION, SOLUTION INTRAMUSCULAR; INTRAVENOUS ONCE
Status: COMPLETED | OUTPATIENT
Start: 2025-07-26 | End: 2025-07-26

## 2025-07-26 RX ORDER — DICYCLOMINE HCL 20 MG
20 TABLET ORAL 4 TIMES DAILY PRN
Qty: 30 TABLET | Refills: 0 | Status: SHIPPED | OUTPATIENT
Start: 2025-07-26 | End: 2025-08-25

## 2025-07-26 RX ORDER — ONDANSETRON 4 MG/1
4 TABLET, ORALLY DISINTEGRATING ORAL EVERY 4 HOURS PRN
Qty: 10 TABLET | Refills: 0 | Status: SHIPPED | OUTPATIENT
Start: 2025-07-26 | End: 2025-08-02

## 2025-07-26 NOTE — ED INITIAL ASSESSMENT (HPI)
Pt here for c/o N/V and indigestion around 10pm. +Diarrhea. +dizziness Denies CP. Pt stated. \"I became violently ill and this is never happened before I just want to make sure its not a heart attack.\"

## 2025-07-26 NOTE — ED PROVIDER NOTES
Patient Seen in: [facility] Emergency Department    History     Chief Complaint   Patient presents with    Nausea/Vomiting/Diarrhea    Dizziness       HPI        History is provided by patient/independent historian: patient, patient's fmaily  79 year old female with hx of HL, here with c/o nausea/vomiting/diarrhea tonight shortly after taco dinner with family. No other sick contacts. + dizziness during the episode and she reports difficulty speaking 2/2 voimting so much. She was worried about a stroke as her dad had similar symptoms. She had tingling in b/l arms. No chest pain/SOB. No unilateral weakness. Family denies facial asymmetry or dysarthria. She feels better but not back to normal.     History reviewed. Past Medical History[1]      History reviewed. Past Surgical History[2]      Home Medications reviewed :  Prescriptions Prior to Admission[3]      History reviewed. Social Hx on file[4]      ROS  Review of Systems   Respiratory:  Negative for shortness of breath.    Cardiovascular:  Negative for chest pain.   Gastrointestinal:  Positive for abdominal pain, diarrhea, nausea and vomiting.   Neurological:  Positive for dizziness.   All other systems reviewed and are negative.     All other pertinent organ systems are reviewed and are negative.      Physical Exam     ED Triage Vitals [07/25/25 2346]   /90   Pulse 103   Resp 20   Temp    Temp src    SpO2 94 %   O2 Device None (Room air)     Vital signs reviewed.      Physical Exam  Vitals and nursing note reviewed.   Cardiovascular:      Pulses: Normal pulses.   Pulmonary:      Effort: No respiratory distress.   Abdominal:      General: There is no distension.      Tenderness: There is no abdominal tenderness.   Neurological:      Mental Status: She is alert.             ED Course       Labs:     Labs Reviewed   COMP METABOLIC PANEL (14) - Abnormal; Notable for the following components:       Result Value    Glucose 153 (*)     AST 34 (*)     All  other components within normal limits   CBC WITH DIFFERENTIAL WITH PLATELET - Abnormal; Notable for the following components:    WBC 15.0 (*)     Neutrophil Absolute Prelim 13.00 (*)     Neutrophil Absolute 13.00 (*)     Lymphocyte Absolute 0.90 (*)     All other components within normal limits   URINALYSIS WITH CULTURE REFLEX - Abnormal; Notable for the following components:    Ketones Urine 10 (*)     Protein Urine Trace (*)     Leukocyte Esterase Urine 75 (*)     RBC Urine 3-5 (*)     Squamous Epi. Cells Few (*)     All other components within normal limits   LIPASE - Normal   URINE CULTURE, ROUTINE         My EKG Interpretation: EKG    Rate, intervals and axes as noted on EKG Report.  Rate: 102  Rhythm: Sinus Rhythm, incomplete RBBB  Reading: abnormal for rate, abnormal for rhythm, no acute ST elevation           As reviewed and Interpreted by me      Imaging Results Available and Reviewed while in ED:   No results found.    Decision rules/scores evaluated: none      Diagnostic labs/tests considered but not ordered: CT abd/pelvis    ED Medications Administered:   Medications   ondansetron (Zofran) 4 MG/2ML injection 4 mg (4 mg Intravenous Given 7/26/25 0123)   ketorolac (Toradol) 15 MG/ML injection 15 mg (15 mg Intravenous Given 7/26/25 0123)   sodium chloride 0.9 % IV bolus 1,000 mL (0 mL Intravenous Stopped 7/26/25 0223)            - pt feeling improved - tolerating PO    MDM       Medical Decision Making      Differential Diagnosis: After obtaining the patient's history, performing the physical exam and reviewing the diagnostics, multiple initial diagnoses were considered based on the presenting problem including gastroenteritis, vagal near syncope, CVA, ACS    External document review: I personally reviewed available external medical records for any recent pertinent discharge summaries, testing, and procedures - the findings are as follows: 7//25 visit with JOSÉ MIGUEL Interiano for colon cancer  screening    Complicating Factors: The patient already  has a past medical history of Cancer (HCC), Hyperlipidemia, and Lymphedema. to contribute to the complexity of this ED evaluation.    Procedures performed: none    Discussed management with physician/appropriate source: none    Considered admission/deescalation of care for: vomiting/diarrhea    Social determinants of health affecting patient care: none    Prescription medications considered: zofran, bentyl, discussed continuing current medication regimen    The patient requires continuous monitoring for: vomiting/diarrhea    Shared decision making: discussed possible admission      Disposition and Plan     Clinical Impression:  1. Gastroenteritis        Disposition:  Discharge    Follow-up:  [address]  [address]  [facility] [place] [address] [phone]    Follow up        Medications Prescribed:  Current Discharge Medication List        START taking these medications    Details   ondansetron 4 MG Oral Tablet Dispersible Take 1 tablet (4 mg total) by mouth every 4 (four) hours as needed for Nausea.  Qty: 10 tablet, Refills: 0      dicyclomine 20 MG Oral Tab Take 1 tablet (20 mg total) by mouth 4 (four) times daily as needed.  Qty: 30 tablet, Refills: 0                            [1]   Past Medical History:   Cancer (HCC)    Hyperlipidemia    Lymphedema   [2]   Past Surgical History:  Procedure Laterality Date    Appendectomy      Breast surgery      Tonsillectomy     [3] (Not in a hospital admission)   [4]   Social History  Socioeconomic History    Marital status:    Tobacco Use    Smoking status: Never    Smokeless tobacco: Never   Vaping Use    Vaping status: Never Used   Substance and Sexual Activity    Alcohol use: Never    Drug use: Never